# Patient Record
(demographics unavailable — no encounter records)

---

## 2025-07-01 NOTE — REVIEW OF SYSTEMS
[FreeTextEntry7] : Bloating [FreeTextEntry8] : Occasional urinary incontinence, urinary frequency [de-identified] : Breast discomfort status post saline implants

## 2025-07-01 NOTE — PHYSICAL EXAM
[Chaperoned Physical Exam] : A chaperone was present in the examining room during all aspects of the physical examination. [MA] : MA [Oriented x3] : oriented x3 [Examination Of The Breasts] : a normal appearance [No Masses] : no breast masses were palpable [Labia Majora] : normal [Labia Minora] : normal [Normal] : normal [Uterine Adnexae] : normal [FreeTextEntry2] :  Appropriately responsive, alert, and no acute distress.  [FreeTextEntry3] :  Thyroid is non-enlarged, nontender. No palpable nodules or goiter. No lymphadenopathy.  [FreeTextEntry7] :  Soft. Nondistended. Nontender. No rebound or guarding. There are no palpable masses.  [FreeTextEntry1] :  External genitalia are within normal limits with no lesions visualized.  [FreeTextEntry4] :  No vaginal discharge, blood or any lesions noted within the vaginal vault.  [FreeTextEntry5] :  A speculum was inserted without any difficulty. The cervix was normal in appearance. Pap smear obtained. No cervical motion tenderness.  [FreeTextEntry6] : Bimanual examination was notable for a normal, nontender uterus. There were no palpable adnexal masses or adnexal tenderness.

## 2025-07-01 NOTE — PLAN
[FreeTextEntry1] : Wellness exam. Normal physical examination. Recommendations: Mammography, bilateral breast ultrasound, ophthalmological, dental, and dermatological examinations.   Status post Cologuard in 2022.  Chronic abdominal bloating.  Recommendations: probiotics. TVUS. CA-125. Follow up with gastroenterologist if symptoms persist.    Discussed proper nutrition and physical exercise. Reviewed age-appropriate vaccinations.

## 2025-07-01 NOTE — HISTORY OF PRESENT ILLNESS
[postmenopausal] : postmenopausal [Y] : Positive pregnancy history [Menarche Age: ____] : age at menarche was [unfilled] [Experiencing Menopausal Sxs] : experiencing menopausal symptoms [Menopause Age: ____] : age at menopause was [unfilled] [No] : Patient does not have concerns regarding sex [Currently Active] : currently active [Men] : men [TextBox_4] : 49 -year old  5 para 4 LMP 2023 presents for an annual examination. Review of systems are notable for abdominal bloating, urinary frequency, and occasional urinary incontinence.  The patient states that her breast implants are uncomfortable defined as a heaviness sensation.  She notes more discomfort in the left than in the right breast.  She has had saline implants since the age of 23 years old.  [Mammogramdate] : 08/2024 [BreastSonogramDate] : 08/2024 [PapSmeardate] : 2023 [ColonoscopyDate] : 2022 [TextBox_43] : Keven [LMPDate] : 2023 [PGHxTotal] : 5 [[facility]] : 4 [[facility]] : 4 [PGHxABSpont] : 1 [FreeTextEntry1] : 2023